# Patient Record
Sex: MALE | Race: WHITE | NOT HISPANIC OR LATINO | URBAN - METROPOLITAN AREA
[De-identification: names, ages, dates, MRNs, and addresses within clinical notes are randomized per-mention and may not be internally consistent; named-entity substitution may affect disease eponyms.]

---

## 2023-04-07 ENCOUNTER — OFFICE (OUTPATIENT)
Dept: URBAN - METROPOLITAN AREA CLINIC 11 | Facility: CLINIC | Age: 43
End: 2023-04-07

## 2023-04-07 VITALS
OXYGEN SATURATION: 98 % | HEART RATE: 85 BPM | SYSTOLIC BLOOD PRESSURE: 126 MMHG | WEIGHT: 190 LBS | HEIGHT: 71 IN | DIASTOLIC BLOOD PRESSURE: 86 MMHG

## 2023-04-07 DIAGNOSIS — K64.4 RESIDUAL HEMORRHOIDAL SKIN TAGS: ICD-10-CM

## 2023-04-07 DIAGNOSIS — K62.89 OTHER SPECIFIED DISEASES OF ANUS AND RECTUM: ICD-10-CM

## 2023-04-07 PROCEDURE — 99204 OFFICE O/P NEW MOD 45 MIN: CPT | Performed by: STUDENT IN AN ORGANIZED HEALTH CARE EDUCATION/TRAINING PROGRAM

## 2023-04-07 RX ORDER — SODIUM PICOSULFATE, MAGNESIUM OXIDE, AND ANHYDROUS CITRIC ACID 10; 3.5; 12 MG/160ML; G/160ML; G/160ML
LIQUID ORAL
Qty: 320 | Refills: 0 | Status: COMPLETED
Start: 2023-04-07 | End: 2023-10-03

## 2023-04-07 RX ORDER — HYDROCORTISONE ACETATE 25 MG/1
SUPPOSITORY RECTAL
Qty: 7 | Refills: 3 | Status: ACTIVE
Start: 2023-04-07

## 2023-04-07 NOTE — SERVICENOTES
Given the patient's 43 and no prior colonoscopy, will schedule him for screening exam.  Will give him Anusol suppositories to use as needed for anal rectal pain due to hemorrhoids.  Encourage high-fiber diet and fiber supplementation with Metamucil.  I discussed with the patient the risks and benefits of the procedure in common please any bleeding, infection, anesthesia related complications.  Patient agrees to proceed with the planned procedure.  Will have him follow up in a few weeks after the procedure.  Counseled him on minimizing NSAIDs, continue Sitz baths and preparation H. all questions addressed. No fissures on exam, likely has some reducible hemorrhoids.

## 2023-04-07 NOTE — SERVICEHPINOTES
Mr. Jose Moreno is a  43-year-old white male with past medical history of  seasonal allergies , who presents to GI clinic for screening colonoscopy as well as anorectal pain for the last 3 months.  Patient reports that since January he has had anorectal pain that radiates to his perineum.  he endorses no overt blood in his stool but sometimes he sees red specks.  He gets constipation on occasions but usually has 1 bowel movement per day.  No nausea or vomiting.  No significant acid reflux or dysphagia.  He occasionally feels a purple protrusion coming out of his anus consistent with hemorrhoid, he has used preparation H and Sitz baths without much improvement.  His symptoms are worse when he sits for long periods of time at home.  His symptoms are usually better in the mornings after he has been sleeping.  he has no associated unintentional weight loss.  He does not smoke, drink alcohol, or use recreational drugs.  No family history of GI malignancies or liver disease.  No previous EGD or colonoscopy.  Previous surgical history includes a vasectomy but no abdominal surgeries.  He uses Advil 3 pills a day for the last 3 months.

## 2023-10-03 ENCOUNTER — AMBULATORY SURGICAL CENTER (OUTPATIENT)
Dept: URBAN - METROPOLITAN AREA SURGERY 2 | Facility: SURGERY | Age: 43
End: 2023-10-03
Payer: COMMERCIAL

## 2023-10-03 ENCOUNTER — OFFICE (OUTPATIENT)
Dept: URBAN - METROPOLITAN AREA PATHOLOGY 12 | Facility: PATHOLOGY | Age: 43
End: 2023-10-03

## 2023-10-03 VITALS
HEART RATE: 110 BPM | RESPIRATION RATE: 14 BRPM | DIASTOLIC BLOOD PRESSURE: 76 MMHG | RESPIRATION RATE: 15 BRPM | HEART RATE: 78 BPM | WEIGHT: 186.4 LBS | RESPIRATION RATE: 17 BRPM | HEART RATE: 78 BPM | HEART RATE: 99 BPM | TEMPERATURE: 98.3 F | TEMPERATURE: 97.5 F | DIASTOLIC BLOOD PRESSURE: 58 MMHG | SYSTOLIC BLOOD PRESSURE: 108 MMHG | HEART RATE: 99 BPM | OXYGEN SATURATION: 96 % | DIASTOLIC BLOOD PRESSURE: 76 MMHG | RESPIRATION RATE: 16 BRPM | SYSTOLIC BLOOD PRESSURE: 132 MMHG | DIASTOLIC BLOOD PRESSURE: 58 MMHG | TEMPERATURE: 98.3 F | HEART RATE: 78 BPM | RESPIRATION RATE: 15 BRPM | DIASTOLIC BLOOD PRESSURE: 58 MMHG | DIASTOLIC BLOOD PRESSURE: 56 MMHG | OXYGEN SATURATION: 97 % | DIASTOLIC BLOOD PRESSURE: 62 MMHG | DIASTOLIC BLOOD PRESSURE: 56 MMHG | OXYGEN SATURATION: 97 % | HEART RATE: 88 BPM | SYSTOLIC BLOOD PRESSURE: 108 MMHG | SYSTOLIC BLOOD PRESSURE: 132 MMHG | RESPIRATION RATE: 16 BRPM | SYSTOLIC BLOOD PRESSURE: 97 MMHG | TEMPERATURE: 98.3 F | HEART RATE: 88 BPM | OXYGEN SATURATION: 99 % | DIASTOLIC BLOOD PRESSURE: 69 MMHG | SYSTOLIC BLOOD PRESSURE: 108 MMHG | HEART RATE: 110 BPM | RESPIRATION RATE: 16 BRPM | OXYGEN SATURATION: 99 % | HEIGHT: 71 IN | HEART RATE: 89 BPM | OXYGEN SATURATION: 96 % | RESPIRATION RATE: 15 BRPM | DIASTOLIC BLOOD PRESSURE: 62 MMHG | TEMPERATURE: 97.5 F | HEIGHT: 71 IN | DIASTOLIC BLOOD PRESSURE: 56 MMHG | HEART RATE: 89 BPM | HEART RATE: 89 BPM | RESPIRATION RATE: 17 BRPM | HEIGHT: 71 IN | SYSTOLIC BLOOD PRESSURE: 97 MMHG | SYSTOLIC BLOOD PRESSURE: 132 MMHG | TEMPERATURE: 97.5 F | DIASTOLIC BLOOD PRESSURE: 76 MMHG | RESPIRATION RATE: 14 BRPM | HEART RATE: 99 BPM | WEIGHT: 186.4 LBS | SYSTOLIC BLOOD PRESSURE: 97 MMHG | OXYGEN SATURATION: 97 % | OXYGEN SATURATION: 96 % | RESPIRATION RATE: 14 BRPM | WEIGHT: 186.4 LBS | DIASTOLIC BLOOD PRESSURE: 69 MMHG | OXYGEN SATURATION: 99 % | DIASTOLIC BLOOD PRESSURE: 62 MMHG | RESPIRATION RATE: 17 BRPM | HEART RATE: 88 BPM | DIASTOLIC BLOOD PRESSURE: 69 MMHG | HEART RATE: 110 BPM

## 2023-10-03 DIAGNOSIS — Z12.11 ENCOUNTER FOR SCREENING FOR MALIGNANT NEOPLASM OF COLON: ICD-10-CM

## 2023-10-03 DIAGNOSIS — K62.1 RECTAL POLYP: ICD-10-CM

## 2023-10-03 DIAGNOSIS — K64.8 OTHER HEMORRHOIDS: ICD-10-CM

## 2023-10-03 PROCEDURE — 45385 COLONOSCOPY W/LESION REMOVAL: CPT | Mod: 33 | Performed by: STUDENT IN AN ORGANIZED HEALTH CARE EDUCATION/TRAINING PROGRAM

## 2023-10-03 PROCEDURE — 88305 TISSUE EXAM BY PATHOLOGIST: CPT | Mod: TC | Performed by: STUDENT IN AN ORGANIZED HEALTH CARE EDUCATION/TRAINING PROGRAM

## 2023-10-03 RX ADMIN — PROPOFOL 350 MG: 10 INJECTION, EMULSION INTRAVENOUS at 13:32

## 2023-10-03 NOTE — SERVICEHPINOTES
Mr. Jose Moreno is a 43-year-old white male with past medical history of  seasonal allergies , who initially presented to GI clinic for screening colonoscopy as well as anorectal pain.   br
br Clinic visit 4/7/23:
br Patient reports that since January he has had anorectal pain that radiates to his perineum.  he endorses no overt blood in his stool but sometimes he sees red specks.  He gets constipation on occasions but usually has 1 bowel movement per day.  No nausea or vomiting.  No significant acid reflux or dysphagia.  He occasionally feels a purple protrusion coming out of his anus consistent with hemorrhoid, he has used preparation H and Sitz baths without much improvement.  His symptoms are worse when he sits for long periods of time at home.  His symptoms are usually better in the mornings after he has been sleeping.  he has no associated unintentional weight loss.  He does not smoke, drink alcohol, or use recreational drugs.  No family history of GI malignancies or liver disease.  No previous EGD or colonoscopy.  Previous surgical history includes a vasectomy but no abdominal surgeries.  He uses Advil 3 pills a day for the last 3 months. 
br
br   Colonoscopy 10/3/23:  
br
Patient reports still some mild anorectal discomfort but it improved with the anusol supp. Ready for colonoscopy today.

## 2023-10-05 LAB
GASTRO ONE PATHOLOGY: PDF REPORT: (no result)